# Patient Record
Sex: MALE | Race: WHITE | NOT HISPANIC OR LATINO | URBAN - METROPOLITAN AREA
[De-identification: names, ages, dates, MRNs, and addresses within clinical notes are randomized per-mention and may not be internally consistent; named-entity substitution may affect disease eponyms.]

---

## 2018-05-16 ENCOUNTER — HOSPITAL ENCOUNTER (OUTPATIENT)
Facility: HOSPITAL | Age: 53
Discharge: LEFT BEFORE DISCHARGE | End: 2018-05-17
Attending: EMERGENCY MEDICINE | Admitting: INTERNAL MEDICINE
Payer: COMMERCIAL

## 2018-05-16 DIAGNOSIS — R00.0 TACHYCARDIA: ICD-10-CM

## 2018-05-16 DIAGNOSIS — R06.02 SOB (SHORTNESS OF BREATH): Primary | ICD-10-CM

## 2018-05-16 DIAGNOSIS — R11.2 NON-INTRACTABLE VOMITING WITH NAUSEA, UNSPECIFIED VOMITING TYPE: ICD-10-CM

## 2018-05-16 DIAGNOSIS — R07.9 CHEST PAIN: ICD-10-CM

## 2018-05-16 LAB
ALBUMIN SERPL BCP-MCNC: 4 G/DL
ALP SERPL-CCNC: 120 U/L
ALT SERPL W/O P-5'-P-CCNC: 15 U/L
ANION GAP SERPL CALC-SCNC: 11 MMOL/L
AST SERPL-CCNC: 16 U/L
BASOPHILS # BLD AUTO: 0.03 K/UL
BASOPHILS NFR BLD: 0.7 %
BILIRUB SERPL-MCNC: 0.3 MG/DL
BNP SERPL-MCNC: 125 PG/ML
BUN SERPL-MCNC: 16 MG/DL
CALCIUM SERPL-MCNC: 9.1 MG/DL
CHLORIDE SERPL-SCNC: 110 MMOL/L
CO2 SERPL-SCNC: 21 MMOL/L
CREAT SERPL-MCNC: 1.2 MG/DL
DIFFERENTIAL METHOD: ABNORMAL
EOSINOPHIL # BLD AUTO: 0.1 K/UL
EOSINOPHIL NFR BLD: 2.2 %
ERYTHROCYTE [DISTWIDTH] IN BLOOD BY AUTOMATED COUNT: 18 %
EST. GFR  (AFRICAN AMERICAN): >60 ML/MIN/1.73 M^2
EST. GFR  (NON AFRICAN AMERICAN): >60 ML/MIN/1.73 M^2
GLUCOSE SERPL-MCNC: 101 MG/DL
HCT VFR BLD AUTO: 29.7 %
HGB BLD-MCNC: 9.3 G/DL
IMM GRANULOCYTES # BLD AUTO: 0.02 K/UL
IMM GRANULOCYTES NFR BLD AUTO: 0.5 %
LYMPHOCYTES # BLD AUTO: 0.9 K/UL
LYMPHOCYTES NFR BLD: 21.6 %
MCH RBC QN AUTO: 25.2 PG
MCHC RBC AUTO-ENTMCNC: 31.3 G/DL
MCV RBC AUTO: 81 FL
MONOCYTES # BLD AUTO: 0.4 K/UL
MONOCYTES NFR BLD: 10.4 %
NEUTROPHILS # BLD AUTO: 2.6 K/UL
NEUTROPHILS NFR BLD: 64.6 %
NRBC BLD-RTO: 0 /100 WBC
PLATELET # BLD AUTO: 197 K/UL
PMV BLD AUTO: 10.6 FL
POTASSIUM SERPL-SCNC: 4.1 MMOL/L
PROT SERPL-MCNC: 7.1 G/DL
RBC # BLD AUTO: 3.69 M/UL
SODIUM SERPL-SCNC: 142 MMOL/L
TROPONIN I SERPL DL<=0.01 NG/ML-MCNC: 0.01 NG/ML
WBC # BLD AUTO: 4.02 K/UL

## 2018-05-16 PROCEDURE — 93010 ELECTROCARDIOGRAM REPORT: CPT | Mod: ,,, | Performed by: INTERNAL MEDICINE

## 2018-05-16 PROCEDURE — 83880 ASSAY OF NATRIURETIC PEPTIDE: CPT

## 2018-05-16 PROCEDURE — 93005 ELECTROCARDIOGRAM TRACING: CPT

## 2018-05-16 PROCEDURE — 94761 N-INVAS EAR/PLS OXIMETRY MLT: CPT

## 2018-05-16 PROCEDURE — 63600175 PHARM REV CODE 636 W HCPCS: Performed by: EMERGENCY MEDICINE

## 2018-05-16 PROCEDURE — 93010 ELECTROCARDIOGRAM REPORT: CPT | Mod: 76,,, | Performed by: INTERNAL MEDICINE

## 2018-05-16 PROCEDURE — 25000003 PHARM REV CODE 250: Performed by: EMERGENCY MEDICINE

## 2018-05-16 PROCEDURE — 96376 TX/PRO/DX INJ SAME DRUG ADON: CPT

## 2018-05-16 PROCEDURE — 96374 THER/PROPH/DIAG INJ IV PUSH: CPT

## 2018-05-16 PROCEDURE — 99285 EMERGENCY DEPT VISIT HI MDM: CPT | Mod: 25

## 2018-05-16 PROCEDURE — 84484 ASSAY OF TROPONIN QUANT: CPT

## 2018-05-16 PROCEDURE — 99285 EMERGENCY DEPT VISIT HI MDM: CPT | Mod: ,,, | Performed by: EMERGENCY MEDICINE

## 2018-05-16 PROCEDURE — 85025 COMPLETE CBC W/AUTO DIFF WBC: CPT

## 2018-05-16 PROCEDURE — 80053 COMPREHEN METABOLIC PANEL: CPT

## 2018-05-16 RX ORDER — CLOPIDOGREL BISULFATE 75 MG/1
75 TABLET ORAL DAILY
COMMUNITY

## 2018-05-16 RX ORDER — MORPHINE SULFATE 4 MG/ML
6 INJECTION, SOLUTION INTRAMUSCULAR; INTRAVENOUS
Status: COMPLETED | OUTPATIENT
Start: 2018-05-16 | End: 2018-05-16

## 2018-05-16 RX ORDER — ONDANSETRON 4 MG/1
4 TABLET, ORALLY DISINTEGRATING ORAL
Status: COMPLETED | OUTPATIENT
Start: 2018-05-16 | End: 2018-05-16

## 2018-05-16 RX ORDER — LEVETIRACETAM 1000 MG/1
1000 TABLET ORAL 2 TIMES DAILY
COMMUNITY

## 2018-05-16 RX ORDER — LAMOTRIGINE 100 MG/1
100 TABLET ORAL DAILY
COMMUNITY

## 2018-05-16 RX ORDER — CLOPIDOGREL 300 MG/1
600 TABLET, FILM COATED ORAL
Status: COMPLETED | OUTPATIENT
Start: 2018-05-16 | End: 2018-05-16

## 2018-05-16 RX ORDER — SERTRALINE HYDROCHLORIDE 25 MG/1
TABLET, FILM COATED ORAL DAILY
COMMUNITY

## 2018-05-16 RX ORDER — ENOXAPARIN SODIUM 100 MG/ML
80 INJECTION SUBCUTANEOUS
COMMUNITY

## 2018-05-16 RX ORDER — ATORVASTATIN CALCIUM 20 MG/1
20 TABLET, FILM COATED ORAL DAILY
COMMUNITY

## 2018-05-16 RX ORDER — LOSARTAN POTASSIUM 100 MG/1
100 TABLET ORAL DAILY
COMMUNITY

## 2018-05-16 RX ORDER — METOPROLOL SUCCINATE 25 MG/1
25 TABLET, EXTENDED RELEASE ORAL DAILY
COMMUNITY

## 2018-05-16 RX ADMIN — ONDANSETRON 4 MG: 4 TABLET, ORALLY DISINTEGRATING ORAL at 10:05

## 2018-05-16 RX ADMIN — MORPHINE SULFATE 6 MG: 4 INJECTION INTRAVENOUS at 10:05

## 2018-05-16 RX ADMIN — CLOPIDOGREL BISULFATE 600 MG: 300 TABLET, FILM COATED ORAL at 10:05

## 2018-05-17 VITALS
WEIGHT: 181.69 LBS | SYSTOLIC BLOOD PRESSURE: 108 MMHG | RESPIRATION RATE: 20 BRPM | BODY MASS INDEX: 27.54 KG/M2 | HEIGHT: 68 IN | OXYGEN SATURATION: 95 % | TEMPERATURE: 99 F | DIASTOLIC BLOOD PRESSURE: 55 MMHG | HEART RATE: 89 BPM

## 2018-05-17 PROBLEM — I31.9 PERICARDITIS: Status: ACTIVE | Noted: 2018-05-17

## 2018-05-17 PROBLEM — M86.9 OSTEOMYELITIS: Status: ACTIVE | Noted: 2018-05-17

## 2018-05-17 PROBLEM — D64.9 ANEMIA: Status: ACTIVE | Noted: 2018-05-17

## 2018-05-17 PROBLEM — Z86.79 HISTORY OF PERICARDITIS: Status: ACTIVE | Noted: 2018-05-17

## 2018-05-17 PROBLEM — I25.10 CORONARY ARTERY DISEASE: Status: ACTIVE | Noted: 2018-05-17

## 2018-05-17 PROBLEM — R07.9 CHEST PAIN: Status: ACTIVE | Noted: 2018-05-17

## 2018-05-17 PROBLEM — I10 HYPERTENSION: Status: ACTIVE | Noted: 2018-05-17

## 2018-05-17 PROBLEM — F43.10 PTSD (POST-TRAUMATIC STRESS DISORDER): Status: ACTIVE | Noted: 2018-05-17

## 2018-05-17 PROBLEM — Z76.5 MALINGERING: Status: ACTIVE | Noted: 2018-05-17

## 2018-05-17 PROBLEM — E78.5 HYPERLIPEMIA: Status: ACTIVE | Noted: 2018-05-17

## 2018-05-17 PROBLEM — I50.9 CHF (CONGESTIVE HEART FAILURE): Status: ACTIVE | Noted: 2018-05-17

## 2018-05-17 PROBLEM — Z79.01 ANTICOAGULATED: Status: ACTIVE | Noted: 2018-05-17

## 2018-05-17 PROBLEM — Z86.711 HISTORY OF PULMONARY EMBOLUS (PE): Status: ACTIVE | Noted: 2018-05-17

## 2018-05-17 PROBLEM — R56.9 SEIZURES: Status: ACTIVE | Noted: 2018-05-17

## 2018-05-17 PROCEDURE — 94761 N-INVAS EAR/PLS OXIMETRY MLT: CPT

## 2018-05-17 PROCEDURE — 63600175 PHARM REV CODE 636 W HCPCS: Performed by: EMERGENCY MEDICINE

## 2018-05-17 PROCEDURE — G0378 HOSPITAL OBSERVATION PER HR: HCPCS

## 2018-05-17 PROCEDURE — 25500020 PHARM REV CODE 255: Performed by: EMERGENCY MEDICINE

## 2018-05-17 PROCEDURE — 99220 PR INITIAL OBSERVATION CARE,LEVL III: CPT | Mod: SA,,, | Performed by: PHYSICIAN ASSISTANT

## 2018-05-17 RX ORDER — LAMOTRIGINE 100 MG/1
100 TABLET ORAL DAILY
Status: DISCONTINUED | OUTPATIENT
Start: 2018-05-17 | End: 2018-05-17 | Stop reason: HOSPADM

## 2018-05-17 RX ORDER — NITROGLYCERIN 0.4 MG/1
0.4 TABLET SUBLINGUAL EVERY 5 MIN PRN
Status: DISCONTINUED | OUTPATIENT
Start: 2018-05-17 | End: 2018-05-17 | Stop reason: HOSPADM

## 2018-05-17 RX ORDER — METOPROLOL SUCCINATE 25 MG/1
25 TABLET, EXTENDED RELEASE ORAL DAILY
Status: DISCONTINUED | OUTPATIENT
Start: 2018-05-17 | End: 2018-05-17 | Stop reason: HOSPADM

## 2018-05-17 RX ORDER — ACETAMINOPHEN 325 MG/1
650 TABLET ORAL EVERY 8 HOURS PRN
Status: DISCONTINUED | OUTPATIENT
Start: 2018-05-17 | End: 2018-05-17 | Stop reason: HOSPADM

## 2018-05-17 RX ORDER — SERTRALINE HYDROCHLORIDE 25 MG/1
25 TABLET, FILM COATED ORAL DAILY
Status: DISCONTINUED | OUTPATIENT
Start: 2018-05-17 | End: 2018-05-17 | Stop reason: HOSPADM

## 2018-05-17 RX ORDER — IBUPROFEN 200 MG
24 TABLET ORAL
Status: DISCONTINUED | OUTPATIENT
Start: 2018-05-17 | End: 2018-05-17 | Stop reason: HOSPADM

## 2018-05-17 RX ORDER — HYDROCODONE BITARTRATE AND ACETAMINOPHEN 5; 325 MG/1; MG/1
1 TABLET ORAL EVERY 6 HOURS PRN
Status: DISCONTINUED | OUTPATIENT
Start: 2018-05-17 | End: 2018-05-17

## 2018-05-17 RX ORDER — IPRATROPIUM BROMIDE AND ALBUTEROL SULFATE 2.5; .5 MG/3ML; MG/3ML
3 SOLUTION RESPIRATORY (INHALATION) EVERY 4 HOURS PRN
Status: DISCONTINUED | OUTPATIENT
Start: 2018-05-17 | End: 2018-05-17 | Stop reason: HOSPADM

## 2018-05-17 RX ORDER — CLOPIDOGREL BISULFATE 75 MG/1
75 TABLET ORAL DAILY
Status: DISCONTINUED | OUTPATIENT
Start: 2018-05-17 | End: 2018-05-17 | Stop reason: HOSPADM

## 2018-05-17 RX ORDER — SODIUM CHLORIDE 0.9 % (FLUSH) 0.9 %
5 SYRINGE (ML) INJECTION
Status: DISCONTINUED | OUTPATIENT
Start: 2018-05-17 | End: 2018-05-17 | Stop reason: HOSPADM

## 2018-05-17 RX ORDER — MORPHINE SULFATE 4 MG/ML
6 INJECTION, SOLUTION INTRAMUSCULAR; INTRAVENOUS
Status: COMPLETED | OUTPATIENT
Start: 2018-05-17 | End: 2018-05-17

## 2018-05-17 RX ORDER — POLYETHYLENE GLYCOL 3350 17 G/17G
17 POWDER, FOR SOLUTION ORAL DAILY
Status: DISCONTINUED | OUTPATIENT
Start: 2018-05-17 | End: 2018-05-17 | Stop reason: HOSPADM

## 2018-05-17 RX ORDER — IBUPROFEN 200 MG
16 TABLET ORAL
Status: DISCONTINUED | OUTPATIENT
Start: 2018-05-17 | End: 2018-05-17 | Stop reason: HOSPADM

## 2018-05-17 RX ORDER — ENOXAPARIN SODIUM 100 MG/ML
80 INJECTION SUBCUTANEOUS 2 TIMES DAILY
Status: DISCONTINUED | OUTPATIENT
Start: 2018-05-17 | End: 2018-05-17 | Stop reason: HOSPADM

## 2018-05-17 RX ORDER — ONDANSETRON 8 MG/1
8 TABLET, ORALLY DISINTEGRATING ORAL EVERY 8 HOURS PRN
Status: DISCONTINUED | OUTPATIENT
Start: 2018-05-17 | End: 2018-05-17 | Stop reason: HOSPADM

## 2018-05-17 RX ORDER — RAMELTEON 8 MG/1
8 TABLET ORAL NIGHTLY PRN
Status: DISCONTINUED | OUTPATIENT
Start: 2018-05-17 | End: 2018-05-17 | Stop reason: HOSPADM

## 2018-05-17 RX ORDER — LOSARTAN POTASSIUM 50 MG/1
100 TABLET ORAL DAILY
Status: DISCONTINUED | OUTPATIENT
Start: 2018-05-17 | End: 2018-05-17 | Stop reason: HOSPADM

## 2018-05-17 RX ORDER — LEVETIRACETAM 500 MG/1
1000 TABLET ORAL 2 TIMES DAILY
Status: DISCONTINUED | OUTPATIENT
Start: 2018-05-17 | End: 2018-05-17 | Stop reason: HOSPADM

## 2018-05-17 RX ORDER — METOPROLOL SUCCINATE 25 MG/1
25 TABLET, EXTENDED RELEASE ORAL DAILY
Status: DISCONTINUED | OUTPATIENT
Start: 2018-05-17 | End: 2018-05-17

## 2018-05-17 RX ORDER — GLUCAGON 1 MG
1 KIT INJECTION
Status: DISCONTINUED | OUTPATIENT
Start: 2018-05-17 | End: 2018-05-17 | Stop reason: HOSPADM

## 2018-05-17 RX ORDER — ACETAMINOPHEN 325 MG/1
650 TABLET ORAL EVERY 4 HOURS PRN
Status: DISCONTINUED | OUTPATIENT
Start: 2018-05-17 | End: 2018-05-17 | Stop reason: HOSPADM

## 2018-05-17 RX ORDER — ATORVASTATIN CALCIUM 20 MG/1
20 TABLET, FILM COATED ORAL DAILY
Status: DISCONTINUED | OUTPATIENT
Start: 2018-05-17 | End: 2018-05-17 | Stop reason: HOSPADM

## 2018-05-17 RX ADMIN — MORPHINE SULFATE 6 MG: 4 INJECTION INTRAVENOUS at 12:05

## 2018-05-17 RX ADMIN — IOHEXOL 75 ML: 350 INJECTION, SOLUTION INTRAVENOUS at 12:05

## 2018-05-17 NOTE — ASSESSMENT & PLAN NOTE
52M with self-reported cardiac history of CAD, CABG X 3, PCI with stent placement x 6, pericarditis who p/w CP similar to prior MI, not relieved with NTG x 4. Suspect malingering    - EKG without acute changes, initial trop negative, CXR clear, CTA negative  - CP free after morphine, minimal change with NTG  - patient loaded with plavix in ED (allergy to ASA noted)  - check A1c, lipid panel for RF assessment  - trend troponin  - consult cardiology  - echo in AM

## 2018-05-17 NOTE — PROVIDER PROGRESS NOTES - EMERGENCY DEPT.
Encounter Date: 5/16/2018    ED Physician Progress Notes         EKG - STEMI Decision  Initial Reading: No STEMI present.  Response: No Action Needed.

## 2018-05-17 NOTE — ED PROVIDER NOTES
"Encounter Date: 5/16/2018    SCRIBE #1 NOTE: I, Nye Nguyen, am scribing for, and in the presence of,  Dr. Morales. I have scribed the following portions of the note - the Resident attestation.       History     Chief Complaint   Patient presents with    Chest Pain     Pt presents to ED with C/O CP that began around [id number]. Pt took "4 nitrostats" and had "very little" relief. Can't take aspirin- anaphylaxis. +Cardiac HX- 6 MIs, 6 stents.     52-year-old male with a past medical history of multiple MIs, multiple stents, triple bypass, valve repair AAA, presents to the emergency department with complaint of left sided chest pain described as aching radiating into the left shoulder and left jaw surgery around 4:30 PM.  Patient went to his cardiologist who performed an EKG that showed some inferior depressions and was told to come to the hospital emergently.  Patient did not take aspirin as he is ALLERGIC to aspirin.  Patient took 4 sublingual Motrin glycerin tablets without relief.  Patient states that this feels similar to his previous chest pain.  Pain began while at rest.  No exacerbating or relieving factors.  Associated with nausea, vomiting, diaphoresis, lightheadedness.  Patient denies other complaints at this time.          Review of patient's allergies indicates:   Allergen Reactions    Aspirin Anaphylaxis    Adhesive Rash    Penicillins Rash     Past Medical History:   Diagnosis Date    AA (aortic aneurysm)     AAA (abdominal aortic aneurysm)     ACS (acute coronary syndrome)     Cancer     CHF (congestive heart failure)     Coronary artery disease     Heart block     Hyperlipemia     Hypertension     ICH (intracerebral hemorrhage)     Insomnia     MI (myocardial infarction)     Mitral insufficiency     Pericarditis     PTSD (post-traumatic stress disorder)     Seizures      Past Surgical History:   Procedure Laterality Date    BACK SURGERY      BRAIN SURGERY      CARDIAC SURGERY   "    KNEE SURGERY       No family history on file.  Social History   Substance Use Topics    Smoking status: Current Every Day Smoker     Years: 30.00     Types: Cigarettes    Smokeless tobacco: Never Used      Comment: 3 cigarettes/day    Alcohol use No     Review of Systems   Constitutional: Positive for diaphoresis. Negative for chills and fever.   HENT: Negative for congestion and sore throat.    Eyes: Negative for visual disturbance.   Respiratory: Positive for shortness of breath. Negative for cough.    Cardiovascular: Positive for chest pain. Negative for leg swelling.   Gastrointestinal: Positive for nausea and vomiting. Negative for abdominal pain, blood in stool, constipation and diarrhea.   Genitourinary: Negative for dysuria and hematuria.   Musculoskeletal: Negative for arthralgias.   Skin: Negative for rash and wound.   Neurological: Positive for light-headedness. Negative for dizziness, weakness and headaches.   Psychiatric/Behavioral: Negative for confusion.   All other systems reviewed and are negative.      Physical Exam     Initial Vitals [05/16/18 2153]   BP Pulse Resp Temp SpO2   (!) 168/96 (!) 112 20 99 °F (37.2 °C) 97 %      MAP       120         Physical Exam    Nursing note and vitals reviewed.  Constitutional: He appears distressed (appears uncomfortable).   HENT:   Head: Normocephalic and atraumatic.   Mouth/Throat: Oropharynx is clear and moist.   Eyes: Conjunctivae and EOM are normal.   Neck: Normal range of motion. Neck supple.   Cardiovascular: Regular rhythm, normal heart sounds and intact distal pulses.   Tachycardic   Pulmonary/Chest: Breath sounds normal. No respiratory distress.   Abdominal: Soft. Bowel sounds are normal. He exhibits no distension. There is no tenderness.   Musculoskeletal: Normal range of motion.   Neurological: He is alert.   Skin: Skin is warm and dry. Capillary refill takes less than 2 seconds.   Psychiatric: His behavior is normal.         ED Course    Procedures  Labs Reviewed   CBC W/ AUTO DIFFERENTIAL - Abnormal; Notable for the following:        Result Value    RBC 3.69 (*)     Hemoglobin 9.3 (*)     Hematocrit 29.7 (*)     MCV 81 (*)     MCH 25.2 (*)     MCHC 31.3 (*)     RDW 18.0 (*)     Lymph # 0.9 (*)     All other components within normal limits   COMPREHENSIVE METABOLIC PANEL - Abnormal; Notable for the following:     CO2 21 (*)     All other components within normal limits   B-TYPE NATRIURETIC PEPTIDE - Abnormal; Notable for the following:      (*)     All other components within normal limits   TROPONIN I   TROPONIN I   HEMOGLOBIN A1C   BASIC METABOLIC PANEL   MAGNESIUM   PHOSPHORUS   CBC W/ AUTO DIFFERENTIAL   LIPID PANEL   SEDIMENTATION RATE, MANUAL   C-REACTIVE PROTEIN   TROPONIN I     EKG Readings: (Independently Interpreted)   Initial Reading: No STEMI. Rhythm: Sinus Tachycardia. Heart Rate: 106. Ectopy: No Ectopy. Conduction: Normal. ST Segments: Normal ST Segments. T Waves: Normal. Q Waves: III and AVF. Clinical Impression: Sinus Tachycardia          Medical Decision Making:   History:   Old Medical Records: I decided to obtain old medical records.  Independently Interpreted Test(s):   I have ordered and independently interpreted EKG Reading(s) - see prior notes  Clinical Tests:   Lab Tests: Ordered and Reviewed  Radiological Study: Ordered and Reviewed  Medical Tests: Ordered and Reviewed  Other:   I have discussed this case with another health care provider.       APC / Resident Notes:   52 y.o. male presents to the ED with chest pain radiating to the jaw/shoulder with nausea, vomiting, sob, diaphoresis. H/o MI in the past    DDx: Concern for but not limited to acs, pna, ptx, chf exacerbation, msk sprain/strain    Plan: Will obtain cardiac labs. Repeat ekg. Cxr. Will load with plavix as pt asa allergic. Will give morphine for ongoing pain.    Anticipated Dispo: admission    Quintin Acsota MD, Emergency Med [facility], 10:23 PM  5/16/2018    Re-Assessment:    Repeat EKG with larger q-wave in aVF but otherwise unchanged. HR improved. Trop negative. CBC shows mild anemia of 9.3. Rest of labs WNL. CTA negative. Currently chest pain free. Will admit to medicine. Toprol given per med recs. Repeat trop ordered.    Quintin Acosta MD, Emergency Med [facility], 1:23 AM 5/17/2018         Scribe Attestation:   Scribe #1: I performed the above scribed service and the documentation accurately describes the services I performed. I attest to the accuracy of the note.    Attending Attestation:   Physician Attestation Statement for Resident:  As the supervising MD   Physician Attestation Statement: I have personally seen and examined this patient.   I agree with the above history. -: 51 yo male presents with CP [facility]. Pt was noted to have some ST depression in inferior leads and was sent to the ED by his cardiologist. Pt still has persistent CP with moderate improvement after sublingual NTG PTA.    As the supervising MD I agree with the above PE.   -: On exam, lungs are clear to auscultation. Pt is tachycardic. Pt exhibits no significant edema.   As the supervising MD I agree with the above treatment, course, plan, and disposition.   -: EKG shows sinus tachycardia without significant ischemia or STEMI. Plan to admit for observation.   I have reviewed and agree with the residents interpretation of the following: lab data, x-rays and EKG.  I have reviewed the following: old records at this facility.          Physician Attestation for Scribe:      Comments: I, Dr. Ilir Morales, personally performed the services described in this documentation. All medical record entries made by the scribe were at my direction and in my presence.  I have reviewed the chart and agree that the record reflects my personal performance and is accurate and complete. Ilir Morales 2:31 AM 05/17/2018                 Clinical Impression:   The primary  encounter diagnosis was SOB (shortness of breath). Diagnoses of Chest pain, Non-intractable vomiting with nausea, unspecified vomiting type, and Tachycardia were also pertinent to this visit.                           Quintin Acosta MD  Resident  05/17/18 0125       Ilir Mroales MD  05/17/18 8595

## 2018-05-17 NOTE — ED NOTES
Paged Dr. Suarez concerning patient's ampicillin, states he will discuss with PA to talk to patient about adding to his med list.

## 2018-05-17 NOTE — ED NOTES
Patient Identifiers for Ney Brown checked and correct  LOC: The patient is awake, alert and aware of environment with an appropriate affect, the patient is oriented x 3 and speaking appropriate.  APPEARANCE: Patient resting comfortably and in no acute distress, patient is clean and well groomed, patient's clothing is properly fastened.  SKIN: The skin is warm and dry, patient has normal skin turgor and moist mucus membranes,no rashes or lesions.Skin Intact , No Breakdown Noted. Reports diaphoresis with chest pain.  Musculoskeletal :  Normal range of motion noted. Moves all extremeties well, No swelling or tenderness noted  RESPIRATORY: Airway is open and patent, respirations are spontaneous, patient has a normal effort and rate.   CARDIAC: Patient has a normal rate and rhythm, no periphreal edema noted, capillary refill < 3 seconds. Pt reports chest pain 9/10 that radiates to his back, pt has extensive cardiac history.   ABDOMEN: Soft and non tender to palpation, no distention noted. Reports nausea and 3 episodes of vomiting prior to being roomed.  PULSES: 2+  And symmetrical in all extremeties  NEUROLOGIC: PERRL. facial expression is symmetrical, patient moving all extremities, normal sensation in all extremities when touched with a finger.The patient is awake, alert and cooperative with a calm affect, patient is aware of environment.    Will continue to monitor

## 2018-05-17 NOTE — ASSESSMENT & PLAN NOTE
- no prior echo on file, echo in AM  - continue lopressor XL 25 mg daily, losartan 100 mg daily, not on diuretic  - compensated

## 2018-05-17 NOTE — ASSESSMENT & PLAN NOTE
- home medication of Lovenox BID, for h/o PE/DVT per patient  - patient reports compliance, will continue

## 2018-05-17 NOTE — SUBJECTIVE & OBJECTIVE
Past Medical History:   Diagnosis Date    AA (aortic aneurysm)     AAA (abdominal aortic aneurysm)     ACS (acute coronary syndrome)     Cancer     CHF (congestive heart failure)     Coronary artery disease     Heart block     Hyperlipemia     Hypertension     ICH (intracerebral hemorrhage)     Insomnia     MI (myocardial infarction)     Mitral insufficiency     Pericarditis     PTSD (post-traumatic stress disorder)     Seizures        Past Surgical History:   Procedure Laterality Date    BACK SURGERY      BRAIN SURGERY      CARDIAC SURGERY      KNEE SURGERY         Review of patient's allergies indicates:   Allergen Reactions    Aspirin Anaphylaxis    Adhesive Rash    Penicillins Rash       No current facility-administered medications on file prior to encounter.      No current outpatient prescriptions on file prior to encounter.     Family History     None        Social History Main Topics    Smoking status: Current Every Day Smoker     Years: 30.00     Types: Cigarettes    Smokeless tobacco: Never Used      Comment: 3 cigarettes/day    Alcohol use No    Drug use: No    Sexual activity: Not on file     Review of Systems   Constitutional: Negative for chills and fever.   Respiratory: Positive for chest tightness and shortness of breath.    Cardiovascular: Positive for chest pain.   Gastrointestinal: Positive for vomiting. Negative for abdominal pain and diarrhea.   Genitourinary: Negative for difficulty urinating and dysuria.   Musculoskeletal: Positive for back pain.   Neurological: Positive for weakness and light-headedness. Negative for dizziness.     Objective:     Vital Signs (Most Recent):  Temp: 98.9 °F (37.2 °C) (05/17/18 0308)  Pulse: (!) 112 (05/17/18 0308)  Resp: 18 (05/17/18 0308)  BP: (!) 180/92 (05/17/18 0308)  SpO2: 96 % (05/17/18 0308) Vital Signs (24h Range):  Temp:  [98.9 °F (37.2 °C)-99 °F (37.2 °C)] 98.9 °F (37.2 °C)  Pulse:  [] 112  Resp:  [18-23] 18  SpO2:   [96 %-100 %] 96 %  BP: (129-180)/(59-96) 180/92     Weight: 82.4 kg (181 lb 10.5 oz)  Body mass index is 27.62 kg/m².    Physical Exam   Constitutional: He is oriented to person, place, and time. He appears well-developed and well-nourished. No distress.   In bed with chips, Dr. Pepper, iPad w/ headphones, in NAD   HENT:   Head: Normocephalic and atraumatic.   Eyes: EOM are normal. Pupils are equal, round, and reactive to light.   Cardiovascular: Normal rate and regular rhythm.    Murmur heard.  No chest wall tenderness   Pulmonary/Chest: Effort normal and breath sounds normal. No respiratory distress. He has no wheezes.   Abdominal: Soft. Bowel sounds are normal. He exhibits no distension. There is no tenderness.   Musculoskeletal: Normal range of motion. He exhibits no edema or deformity.   Neurological: He is alert and oriented to person, place, and time.   Skin: Skin is warm and dry. He is not diaphoretic.   Nursing note and vitals reviewed.        CRANIAL NERVES     CN III, IV, VI   Pupils are equal, round, and reactive to light.  Extraocular motions are normal.        Significant Labs:   CBC:   Recent Labs  Lab 05/16/18  2215   WBC 4.02   HGB 9.3*   HCT 29.7*        CMP:   Recent Labs  Lab 05/16/18  2215      K 4.1      CO2 21*      BUN 16   CREATININE 1.2   CALCIUM 9.1   PROT 7.1   ALBUMIN 4.0   BILITOT 0.3   ALKPHOS 120   AST 16   ALT 15   ANIONGAP 11   EGFRNONAA >60.0     Cardiac Markers:   Recent Labs  Lab 05/16/18  2215   *     Urine Culture: No results for input(s): LABURIN in the last 48 hours.  Urine Studies: No results for input(s): COLORU, APPEARANCEUA, PHUR, SPECGRAV, PROTEINUA, GLUCUA, KETONESU, BILIRUBINUA, OCCULTUA, NITRITE, UROBILINOGEN, LEUKOCYTESUR, RBCUA, WBCUA, BACTERIA, SQUAMEPITHEL, HYALINECASTS in the last 48 hours.    Invalid input(s): WRIGHTSUR    Significant Imaging: CXR: I have reviewed all pertinent results/findings within the past 24 hours and my  personal findings are:  no acute findings  EKG: I have reviewed all pertinent results/findings within the past 24 hours and my personal findings are: no ischemic changes

## 2018-05-17 NOTE — PROGRESS NOTES
"Pt arrived to floor via wheelchair. NAD noted. No c/o CP. Pt immediately asked where vending machines are and attempted to leave to get snacks. Informed pt of NPO status and possibility of testing in AM. Pt states "Oh, please. I spoke with the cardiologist and they said they weren't doing a STRESS test. Those are just standard orders. They said they would do a heart cath later in the afternoon" I then informed pt that he still needs to remain NPO for [facility] if it is to be completed. Pt left and went to vending machines. Dr. Suarez notified.   "

## 2018-05-17 NOTE — PROGRESS NOTES
"Pt came to  asking for AMA papers. Asked pt as to why he wanted to leave and pt stated, "The doctor is an asshole and he's fucking with my medicines. [facility]" Asked pt to wait a moment so that I can call the MD and get the paperwork. Pt said he was leaving and walked out. MD on call paged, waiting call back.  "

## 2018-05-17 NOTE — H&P
"[facility] Hospital Medicine  History & Physical    Patient Name: Ney Brown  MRN: [id number]  Admission Date: 5/16/2018  Attending Physician: Silas Shane MD   Primary Care Provider: [facility] Medicine Team: [facility] Orlando Rain PA-C     Patient information was obtained from patient and ER records.     Subjective:     Principal Problem:Chest pain    Chief Complaint:   Chief Complaint   Patient presents with    Chest Pain     Pt presents to ED with C/O CP that began around 8219-1644. Pt took "4 nitrostats" and had "very little" relief. Can't take aspirin- anaphylaxis. +Cardiac HX- 6 MIs, 6 stents.        HPI: Ney Brown is a 52M with self-reported history of 6 MIs, CABG x 3 ([place], 2014), 5 cardiac stents ([place], 2015), 1 iliac artery stent, AAA, CVA, CHF, pericarditis, seizures, PTSD, HTN, HLD, osteomyelitis of the jaw (on ampicillin), h/o of PE and DVT with recent IVC filter removal 1 month ago (on lovenox) who presents with chest pain. Patient reports chest pain onset a 4:30pm 05/16/2018, as he was talking with his daughter, doing nothing in particular. Pain radiated up neck and down his arm, exact same as prior MIs. [facility] and had an EKG done at that time concerning for ischemic changes. He was told to go the emergency room and he was [facility] He took four NTG with only minimal improvement, he has an allergy to ASA and was loaded with plavix in the ED, he only had relief after 12 mg of morphine were given in the ED. He has associated SOB, lightheadedness, diaphoresis, emesis x 4. He continues to smoke. He continues to report CP on admit.    The patient showed me his EKG from clinic visit 05/16/2018, EKG did not have any identifiable patient information on it, I suspected the image was downloaded. Upon inspection of the " image details, the image was downloaded on May 10, 2018 and had a file name of [id number]. I then searched Google for the image and found it immediately. I asked to see his EKG again and he was unable to locate it after several minutes of fumbling through his phone. He still insisted the [facility]. The patient was advised to continue looking for the EKG in his e-mail and show it to his primary team and the cardiology team in the morning to review. I informed the patient I would be disclosing the above information with the primary team and he would not be getting any more morphine. The EKG he showed me can be found here: [email or web address]    On admit, EKG without ischemic changes, troponin negative, CXR negative.    Past Medical History:   Diagnosis Date    AA (aortic aneurysm)     AAA (abdominal aortic aneurysm)     ACS (acute coronary syndrome)     Cancer     CHF (congestive heart failure)     Coronary artery disease     Heart block     Hyperlipemia     Hypertension     ICH (intracerebral hemorrhage)     Insomnia     MI (myocardial infarction)     Mitral insufficiency     Pericarditis     PTSD (post-traumatic stress disorder)     Seizures        Past Surgical History:   Procedure Laterality Date    BACK SURGERY      BRAIN SURGERY      CARDIAC SURGERY      KNEE SURGERY         Review of patient's allergies indicates:   Allergen Reactions    Aspirin Anaphylaxis    Adhesive Rash    Penicillins Rash       No current facility-administered medications on file prior to encounter.      No current outpatient prescriptions on file prior to encounter.     Family History     None        Social History Main Topics    Smoking status: Current Every Day Smoker     Years: 30.00     Types: Cigarettes    Smokeless tobacco: Never Used      Comment: 3 cigarettes/day    Alcohol use No    Drug use: No    Sexual activity: Not on file     Review of Systems    Constitutional: Negative for chills and fever.   Respiratory: Positive for chest tightness and shortness of breath.    Cardiovascular: Positive for chest pain.   Gastrointestinal: Positive for vomiting. Negative for abdominal pain and diarrhea.   Genitourinary: Negative for difficulty urinating and dysuria.   Musculoskeletal: Positive for back pain.   Neurological: Positive for weakness and light-headedness. Negative for dizziness.     Objective:     Vital Signs (Most Recent):  Temp: 98.9 °F (37.2 °C) (05/17/18 0308)  Pulse: (!) 112 (05/17/18 0308)  Resp: 18 (05/17/18 0308)  BP: (!) 180/92 (05/17/18 0308)  SpO2: 96 % (05/17/18 0308) Vital Signs (24h Range):  Temp:  [98.9 °F (37.2 °C)-99 °F (37.2 °C)] 98.9 °F (37.2 °C)  Pulse:  [] 112  Resp:  [18-23] 18  SpO2:  [96 %-100 %] 96 %  BP: (129-180)/(59-96) 180/92     Weight: 82.4 kg (181 lb 10.5 oz)  Body mass index is 27.62 kg/m².    Physical Exam   Constitutional: He is oriented to person, place, and time. He appears well-developed and well-nourished. No distress.   In bed with chips, Dr. Pepper, iPad w/ headphones, in NAD   HENT:   Head: Normocephalic and atraumatic.   Eyes: EOM are normal. Pupils are equal, round, and reactive to light.   Cardiovascular: Normal rate and regular rhythm.    Murmur heard.  No chest wall tenderness   Pulmonary/Chest: Effort normal and breath sounds normal. No respiratory distress. He has no wheezes.   Abdominal: Soft. Bowel sounds are normal. He exhibits no distension. There is no tenderness.   Musculoskeletal: Normal range of motion. He exhibits no edema or deformity.   Neurological: He is alert and oriented to person, place, and time.   Skin: Skin is warm and dry. He is not diaphoretic.   Nursing note and vitals reviewed.        CRANIAL NERVES     CN III, IV, VI   Pupils are equal, round, and reactive to light.  Extraocular motions are normal.        Significant Labs:   CBC:   Recent Labs  Lab 05/16/18  2215   WBC 4.02   HGB  9.3*   HCT 29.7*        CMP:   Recent Labs  Lab 05/16/18  2215      K 4.1      CO2 21*      BUN 16   CREATININE 1.2   CALCIUM 9.1   PROT 7.1   ALBUMIN 4.0   BILITOT 0.3   ALKPHOS 120   AST 16   ALT 15   ANIONGAP 11   EGFRNONAA >60.0     Cardiac Markers:   Recent Labs  Lab 05/16/18  2215   *     Urine Culture: No results for input(s): LABURIN in the last 48 hours.  Urine Studies: No results for input(s): COLORU, APPEARANCEUA, PHUR, SPECGRAV, PROTEINUA, GLUCUA, KETONESU, BILIRUBINUA, OCCULTUA, NITRITE, UROBILINOGEN, LEUKOCYTESUR, RBCUA, WBCUA, BACTERIA, SQUAMEPITHEL, HYALINECASTS in the last 48 hours.    Invalid input(s): WRIGHTSUR    Significant Imaging: CXR: I have reviewed all pertinent results/findings within the past 24 hours and my personal findings are:  no acute findings  EKG: I have reviewed all pertinent results/findings within the past 24 hours and my personal findings are: no ischemic changes    Assessment/Plan:     * Chest pain    52M with self-reported cardiac history of CAD, CABG X 3, PCI with stent placement x 6, pericarditis who p/w CP similar to prior MI, not relieved with NTG x 4. Suspect malingering    - EKG without acute changes, initial trop negative, CXR clear, CTA negative  - CP free after morphine, minimal change with NTG  - patient loaded with plavix in ED (allergy to ASA noted)  - check A1c, lipid panel for RF assessment  - trend troponin  - consult cardiology  - echo in AM        Malingering    - see HPI  - do not suspect ACS  - NO OPIATES        History of pulmonary embolus (PE)    - lovenox        Anticoagulated    - home medication of Lovenox BID, for h/o PE/DVT per patient  - patient reports compliance, will continue        History of pericarditis    - check ESR/CRP        PTSD (post-traumatic stress disorder)    - zoloft        Seizures    - keppra        Anemia    - unknown baseline, trend        Hyperlipemia    - statin        Hypertension    - c/w  home meds: metorprolol succinate 25 mg daily, losartan 100 mg daily        Coronary artery disease    - statin, plavix  - see above        CHF (congestive heart failure)    - no prior echo on file, echo in AM  - continue lopressor XL 25 mg daily, losartan 100 mg daily, not on diuretic  - compensated          VTE Risk Mitigation         Ordered     enoxaparin injection 80 mg  2 times daily      05/17/18 0210             Orlando Rain PA-C  Department of Hospital Medicine   [facility]

## 2018-05-17 NOTE — ED TRIAGE NOTES
Pt reports chest pain that radiates to back, was seen at cardiologist this morning. Reports diaphoresis, nausea and 3 episodes of vomiting. Pt has extensive cardiac history, states this chest pain feels like his last heart attack

## 2018-05-17 NOTE — HPI
"Ney Brown is a 52M with self-reported history of 6 MIs, CABG x 3 ([place], 2014), 5 cardiac stents ([place], 2015), 1 iliac artery stent, AAA, CVA, CHF, pericarditis, seizures, PTSD, HTN, HLD, osteomyelitis of the jaw (on ampicillin), h/o of PE and DVT with recent IVC filter removal 1 month ago (on lovenox) who presents with chest pain. Patient reports chest pain onset a 4:30pm 05/16/2018, as he was talking with his daughter, doing nothing in particular. Pain radiated up neck and down his arm, exact same as prior MIs. [facility] and had an EKG done at that time concerning for ischemic changes. He was told to go the emergency room and he was taken [facility] He took four NTG with only minimal improvement, he has an allergy to ASA and was loaded with plavix in the ED, he only had relief after 12 mg of morphine were given in the ED. He has associated SOB, lightheadedness, diaphoresis, emesis x 4. He continues to smoke. He continues to report CP on admit.    The patient showed me his EKG from clinic visit 05/16/2018, EKG did not have any identifiable patient information on it, I suspected the image was downloaded. Upon inspection of the image details, the image was downloaded on May 10, 2018 and had a file name of [id number]. I then searched Google for the image and found it immediately. I asked to see his EKG again and he was unable to locate it after several minutes of fumbling through his phone. He still insisted the [facility] clinic. The patient was advised to continue looking for the EKG in his e-mail and show it to his primary team and the cardiology team in the morning to review. I informed the patient I would be disclosing the above information with the primary team and he would not be getting any more morphine. The EKG he showed me can be found here: [email or web address]    On " admit, EKG without ischemic changes, troponin negative, CXR negative.

## 2018-05-31 NOTE — DISCHARGE SUMMARY
"[facility] Hospital Medicine  Discharge Summary      Patient Name: Ney Brown  MRN: [id number]  Admission Date: 5/16/2018  Hospital Length of Stay: 0 days  Discharge Date and Time: 5/17/2018  6:06 AM  Attending Physician: Caitie att. providers found   Discharging Provider: Orlando Rain PA-C  Primary Care Provider: [facility] Medicine Team: [facility] Orlando Rain PA-C    HPI:   Ney Brown is a 52M with self-reported history of 6 MIs, CABG x 3 ([place], 2014), 5 cardiac stents ([place], 2015), 1 iliac artery stent, AAA, CVA, CHF, pericarditis, seizures, PTSD, HTN, HLD, osteomyelitis of the jaw (on ampicillin), h/o of PE and DVT with recent IVC filter removal 1 month ago (on lovenox) who presents with chest pain. Patient reports chest pain onset a 4:30pm 05/16/2018, as he was talking with his daughter, doing nothing in particular. Pain radiated up neck and down his arm, exact same as prior MIs. [facility] and had an EKG done at that time concerning for ischemic changes. He was told to go the emergency room and he was [facility] [place] [facility] He took four NTG with only minimal improvement, he has an allergy to ASA and was loaded with plavix in the ED, he only had relief after 12 mg of morphine were given in the ED. He has associated SOB, lightheadedness, diaphoresis, emesis x 4. He continues to smoke. He continues to report CP on admit.    The patient showed me his EKG from clinic visit 05/16/2018, EKG did not have any identifiable patient information on it, I suspected the image was downloaded. Upon inspection of the image details, the image was downloaded on May 10, 2018 and had a file name of [id number]. I then searched Google for the image and found it immediately. I asked to see his EKG again and he was unable to locate it after several minutes of " fumbling through his phone. He still insisted the [facility] from clinic. The patient was advised to continue looking for the EKG in his e-mail and show it to his primary team and the cardiology team in the morning to review. I informed the patient I would be disclosing the above information with the primary team and he would not be getting any more morphine. The EKG he showed me can be found here: [email or web address]    On admit, EKG without ischemic changes, troponin negative, CXR negative.    * No surgery found *      Hospital Course:   Patient left AMA shortly after admission.     Consults:     * Chest pain    52M with self-reported cardiac history of CAD, CABG X 3, PCI with stent placement x 6, pericarditis who p/w CP similar to prior MI, not relieved with NTG x 4. Suspect malingering    - EKG without acute changes, initial trop negative, CXR clear, CTA negative  - CP free after morphine, minimal change with NTG  - patient loaded with plavix in ED (allergy to ASA noted)  - check A1c, lipid panel for RF assessment  - trend troponin  - consult cardiology  - echo in AM        Malingering    - see HPI  - do not suspect ACS  - NO OPIATES        Osteomyelitis    - osteo of jaw from tooth abscess  - continue home ampicillin, confirm PICC line placement        PTSD (post-traumatic stress disorder)    - zoloft, lamictal          Final Active Diagnoses:    Diagnosis Date Noted POA    PRINCIPAL PROBLEM:  Chest pain [R07.9] 05/17/2018 Yes    Malingering [Z76.5] 05/17/2018 Not Applicable    CHF (congestive heart failure) [I50.9] 05/17/2018 Yes    Coronary artery disease [I25.10] 05/17/2018 Yes    Hypertension [I10] 05/17/2018 Yes    Hyperlipemia [E78.5] 05/17/2018 Yes    Anemia [D64.9] 05/17/2018 Yes    Seizures [R56.9] 05/17/2018 Yes    PTSD (post-traumatic stress disorder) [F43.10] 05/17/2018 Yes    History of pericarditis [Z86.79] 05/17/2018 Not Applicable    Anticoagulated [Z79.01]  05/17/2018 Not Applicable    History of pulmonary embolus (PE) [Z86.711] 05/17/2018 Yes    Osteomyelitis [M86.9] 05/17/2018 Yes      Problems Resolved During this Admission:    Diagnosis Date Noted Date Resolved POA       Discharged Condition: against medical advice    Disposition: Left Against Medical Adv*    Follow Up:  Follow-up Information     [facility] In [facility] weeks.    Contact information:  [address] [facility] [address]  [phone]                 Patient Instructions:   No discharge procedures on file.    Significant Diagnostic Studies: Labs: BMP: No results for input(s): GLU, NA, K, CL, CO2, BUN, CREATININE, CALCIUM, MG in the last 48 hours., CMP No results for input(s): NA, K, CL, CO2, GLU, BUN, CREATININE, CALCIUM, PROT, ALBUMIN, BILITOT, ALKPHOS, AST, ALT, ANIONGAP, ESTGFRAFRICA, EGFRNONAA in the last 48 hours., Troponin No results for input(s): TROPONINI in the last 168 hours. and A1C: No results for input(s): HGBA1C in the last 4320 hours.  Microbiology: Urine Culture  No results found for: LABURIN  Radiology: X-Ray: CXR: X-Ray Chest 1 View (CXR): No results found for this visit on 05/16/18. and X-Ray Chest PA and Lateral (CXR):   Results for orders placed or performed during the hospital encounter of 05/16/18   X-Ray Chest PA And Lateral    Narrative    EXAMINATION:  XR CHEST PA AND LATERAL    CLINICAL HISTORY:  Chest Pain;    TECHNIQUE:  Frontal and lateral views of the chest were performed.    COMPARISON:  None.    FINDINGS:  Lateral radiograph is limited by under penetration of the x-ray beam.  Cardiac wires overlie the chest on the frontal view.  Cardiac silhouette is not significantly enlarged.  There are postoperative changes of prior median sternotomy.  No sizable pleural effusion.  No pneumothorax.      Impression    No acute cardiopulmonary finding.      Electronically signed by: Torito Hutchinson MD  Date:    05/16/2018  Time:    23:34     Cardiac  Graphics: ECG: no ischemic changes    Pending Diagnostic Studies:     Procedure Component Value Units Date/Time    2D echo with color flow doppler [[id number]]     Order Status:  Sent Lab Status:  No result          Medications:  Reconciled Home Medications:      Medication List      CONTINUE taking these medications    atorvastatin 20 MG tablet  Commonly known as:  LIPITOR  Take 20 mg by mouth once daily.     clopidogrel 75 mg tablet  Commonly known as:  PLAVIX  Take 75 mg by mouth once daily.     lamoTRIgine 100 MG tablet  Commonly known as:  LAMICTAL  Take 100 mg by mouth once daily.     levETIRAcetam 1000 MG tablet  Commonly known as:  KEPPRA  Take 1,000 mg by mouth 2 (two) times daily.     losartan 100 MG tablet  Commonly known as:  COZAAR  Take 100 mg by mouth once daily.     LOVENOX 80 mg/0.8 mL Syrg  Generic drug:  enoxaparin  Inject 80 mg into the skin every 12 (twelve) hours.     metoprolol succinate 25 MG 24 hr tablet  Commonly known as:  TOPROL-XL  Take 25 mg by mouth once daily.     sertraline 25 MG tablet  Commonly known as:  ZOLOFT  Take by mouth once daily.     sodium chloride 0.9% SolP 100 mL with ampicillin 2 gram SolR 2 g  Inject 2 g into the vein.     TEMAZEPAM ORAL  Take 30 mg by mouth every evening.            Indwelling Lines/Drains at time of discharge:   Lines/Drains/Airways     Peripherally Inserted Central Catheter Line                 PICC Single Lumen left brachial -- days                Time spent on the discharge of patient: 20 minutes  Patient was seen and examined on the date of discharge and determined to be suitable for discharge.         Orlando Rain PA-C  Department of Hospital Medicine  [facility]